# Patient Record
Sex: MALE | Race: ASIAN | NOT HISPANIC OR LATINO
[De-identification: names, ages, dates, MRNs, and addresses within clinical notes are randomized per-mention and may not be internally consistent; named-entity substitution may affect disease eponyms.]

---

## 2019-01-18 ENCOUNTER — APPOINTMENT (OUTPATIENT)
Dept: CARDIOLOGY | Facility: CLINIC | Age: 64
End: 2019-01-18
Payer: MEDICARE

## 2019-01-18 VITALS
TEMPERATURE: 97.7 F | BODY MASS INDEX: 24.11 KG/M2 | OXYGEN SATURATION: 97 % | DIASTOLIC BLOOD PRESSURE: 76 MMHG | SYSTOLIC BLOOD PRESSURE: 129 MMHG | HEIGHT: 66 IN | HEART RATE: 57 BPM | RESPIRATION RATE: 18 BRPM | WEIGHT: 150 LBS

## 2019-01-18 DIAGNOSIS — R07.9 CHEST PAIN, UNSPECIFIED: ICD-10-CM

## 2019-01-18 DIAGNOSIS — I10 ESSENTIAL (PRIMARY) HYPERTENSION: ICD-10-CM

## 2019-01-18 PROCEDURE — 93306 TTE W/DOPPLER COMPLETE: CPT

## 2019-01-18 PROCEDURE — 93015 CV STRESS TEST SUPVJ I&R: CPT

## 2019-01-18 PROCEDURE — 99214 OFFICE O/P EST MOD 30 MIN: CPT | Mod: 25

## 2019-01-18 NOTE — PHYSICAL EXAM
[General Appearance - Well Developed] : well developed [Normal Appearance] : normal appearance [Well Groomed] : well groomed [General Appearance - Well Nourished] : well nourished [No Deformities] : no deformities [General Appearance - In No Acute Distress] : no acute distress [Normal Oral Mucosa] : normal oral mucosa [No Oral Pallor] : no oral pallor [No Oral Cyanosis] : no oral cyanosis [Normal Jugular Venous A Waves Present] : normal jugular venous A waves present [Normal Jugular Venous V Waves Present] : normal jugular venous V waves present [No Jugular Venous Coffey A Waves] : no jugular venous coffey A waves [Respiration, Rhythm And Depth] : normal respiratory rhythm and effort [Exaggerated Use Of Accessory Muscles For Inspiration] : no accessory muscle use [Auscultation Breath Sounds / Voice Sounds] : lungs were clear to auscultation bilaterally [Heart Rate And Rhythm] : heart rate and rhythm were normal [Heart Sounds] : normal S1 and S2 [Murmurs] : no murmurs present [Edema] : no peripheral edema present [1+] : left 1+ [Abdomen Soft] : soft [Abdomen Tenderness] : non-tender [Abdomen Mass (___ Cm)] : no abdominal mass palpated [Abnormal Walk] : normal gait [Gait - Sufficient For Exercise Testing] : the gait was sufficient for exercise testing [Nail Clubbing] : no clubbing of the fingernails [Cyanosis, Localized] : no localized cyanosis [Petechial Hemorrhages (___cm)] : no petechial hemorrhages [] : no ischemic changes [Oriented To Time, Place, And Person] : oriented to person, place, and time [Affect] : the affect was normal [Mood] : the mood was normal [No Anxiety] : not feeling anxious

## 2019-01-19 NOTE — HISTORY OF PRESENT ILLNESS
[FreeTextEntry1] : Patient reports left lower chest discomfort for the last month at bedtime, not from exertion, lasting few minutes. Patient denies SOB. Patient denies syncope. He reports occasional palpitations. Patient had recent CXR and endoscopy which were normal. I advised patient to undergo a stress echo. \par \par 63-year-old male with HTN and HLD presents for followup.  Patient was last seen on 2/19/12 for evaluation of chest discomfort and coronary artery calcification on CT scan.  Patient underwent a stress echo and completed 7 minutes of Sudarshan protocol.  There was no ECG or echocardiographic evidence of ischemia.  He was orthostatic so his Losartan HCTZ was changed to just Losartan 50 mg.  He is also on Amlodipine 5 mg and Atenolol 25 mg for HTN.  \par \par (1) Chest discomfort, coronary artery calcification on CT - To rule out cardiac pathology, he underwent a stress echo. He completed 7 minutes of Sudarshan protocol. There was no ECG or echocardiographic evidence of ischemia. Patient was reassured.\par \par (2) Positional lightheadedness - Patient was orthostatic on exam. I will change his Losartan-HCTZ to just Losartan. Hopefully he will be less lightheaded.\par \par (3) Follow-up - 6 months.\par \par Patient reports that for the past several months he has been experiencing upper chest discomfort at night.  He denies any CP with exertion.  He underwent an EGD which was unremarkable.  Following the EGD he had episodes of hemoptysis.  He underwent a chest CT which did not show any significant lung pathology but did reveal coronary artery calcification.  He was referred for further evaluation.  He does report SOB with exertion.  He also complains of lightheadedness when he goes from supine or sitting position to standing.  He reports no history of syncope.

## 2019-01-19 NOTE — DISCUSSION/SUMMARY
[FreeTextEntry1] : 56-year-old male with HTN and hyperlipidemia presents for evaluation of chest discomfort and coronary artery calcification on CT scan.  Patient reports that for the past several months he has been experiencing upper chest discomfort at night.  He denies any CP with exertion.  He underwent an EGD which was unremarkable.  Following the EGD he had episodes of hemoptysis.  He underwent a chest CT which did not show any significant lung pathology but did reveal coronary artery calcification.  He was referred for further evaluation.  He does report SOB with exertion.  He also complains of lightheadedness when he goes from supine or sitting position to standing.  He reports no history of syncope.\par \par (1) Chest discomfort, coronary artery calcification on CT - To rule out cardiac pathology, he underwent a stress echo. He completed 7 minutes of Sudarshan protocol. There was no ECG or echocardiographic evidence of ischemia. Patient was reassured.\par \par (2) Positional lightheadedness -  Patient was orthostatic on exam.  I will change his Losartan-HCTZ to just Losartan.  Hopefully he will be less lightheaded.\par \par (3) Follow-up - 6 months.

## 2019-01-19 NOTE — REASON FOR VISIT
[Follow-Up - Clinic] : a clinic follow-up of [Chest Pain] : chest pain [FreeTextEntry1] : coronary artery calcification